# Patient Record
Sex: MALE | Race: WHITE
[De-identification: names, ages, dates, MRNs, and addresses within clinical notes are randomized per-mention and may not be internally consistent; named-entity substitution may affect disease eponyms.]

---

## 2018-03-06 ENCOUNTER — HOSPITAL ENCOUNTER (EMERGENCY)
Age: 53
LOS: 1 days | Discharge: HOME | End: 2018-03-07

## 2018-03-06 ENCOUNTER — HOSPITAL ENCOUNTER (EMERGENCY)
Dept: HOSPITAL 91 - E/R | Age: 53
LOS: 1 days | Discharge: HOME | End: 2018-03-07
Payer: COMMERCIAL

## 2018-03-06 DIAGNOSIS — R07.89: Primary | ICD-10-CM

## 2018-03-06 LAB
ADD MAN DIFF?: NO
ALANINE AMINOTRANSFERASE: 44 IU/L (ref 13–69)
ALBUMIN/GLOBULIN RATIO: 1.51
ALBUMIN: 4.4 G/DL (ref 3.3–4.9)
ALKALINE PHOSPHATASE: 71 IU/L (ref 42–121)
ANION GAP: 16 (ref 8–16)
ASPARTATE AMINO TRANSFERASE: 31 IU/L (ref 15–46)
B-TYPE NATRIURETIC PEPTIDE: 49 PG/ML (ref 0–125)
BASOPHIL #: 0.1 10^3/UL (ref 0–0.1)
BASOPHILS %: 0.7 % (ref 0–2)
BILIRUBIN,DIRECT: 0 MG/DL (ref 0–0.2)
BILIRUBIN,TOTAL: 0.3 MG/DL (ref 0.2–1.3)
BLOOD UREA NITROGEN: 21 MG/DL (ref 7–20)
CALCIUM: 9.9 MG/DL (ref 8.4–10.2)
CARBON DIOXIDE: 27 MMOL/L (ref 21–31)
CHLORIDE: 102 MMOL/L (ref 97–110)
CREATININE: 0.82 MG/DL (ref 0.61–1.24)
EOSINOPHILS #: 0.2 10^3/UL (ref 0–0.5)
EOSINOPHILS %: 2.3 % (ref 0–7)
GLOBULIN: 2.9 G/DL (ref 1.3–3.2)
GLUCOSE: 116 MG/DL (ref 70–220)
HEMATOCRIT: 45.2 % (ref 42–52)
HEMOGLOBIN: 15.6 G/DL (ref 14–18)
LYMPHOCYTES #: 3.3 10^3/UL (ref 0.8–2.9)
LYMPHOCYTES %: 37.8 % (ref 15–51)
MEAN CORPUSCULAR HEMOGLOBIN: 30.5 PG (ref 29–33)
MEAN CORPUSCULAR HGB CONC: 34.5 G/DL (ref 32–37)
MEAN CORPUSCULAR VOLUME: 88.3 FL (ref 82–101)
MEAN PLATELET VOLUME: 11 FL (ref 7.4–10.4)
MONOCYTE #: 0.7 10^3/UL (ref 0.3–0.9)
MONOCYTES %: 7.8 % (ref 0–11)
NEUTROPHIL #: 4.5 10^3/UL (ref 1.6–7.5)
NEUTROPHILS %: 51.1 % (ref 39–77)
NUCLEATED RED BLOOD CELLS #: 0 10^3/UL (ref 0–0)
NUCLEATED RED BLOOD CELLS%: 0 /100WBC (ref 0–0)
PLATELET COUNT: 203 10^3/UL (ref 140–415)
POTASSIUM: 3.9 MMOL/L (ref 3.5–5.1)
RED BLOOD COUNT: 5.12 10^6/UL (ref 4.7–6.1)
RED CELL DISTRIBUTION WIDTH: 13.4 % (ref 11.5–14.5)
SODIUM: 141 MMOL/L (ref 135–144)
TOTAL PROTEIN: 7.3 G/DL (ref 6.1–8.1)
TROPONIN-I: < 0.012 NG/ML (ref 0–0.12)
WHITE BLOOD COUNT: 8.7 10^3/UL (ref 4.8–10.8)

## 2018-03-06 PROCEDURE — 84484 ASSAY OF TROPONIN QUANT: CPT

## 2018-03-06 PROCEDURE — 99285 EMERGENCY DEPT VISIT HI MDM: CPT

## 2018-03-06 PROCEDURE — 36415 COLL VENOUS BLD VENIPUNCTURE: CPT

## 2018-03-06 PROCEDURE — 93005 ELECTROCARDIOGRAM TRACING: CPT

## 2018-03-06 PROCEDURE — 71045 X-RAY EXAM CHEST 1 VIEW: CPT

## 2018-03-06 PROCEDURE — 83880 ASSAY OF NATRIURETIC PEPTIDE: CPT

## 2018-03-06 PROCEDURE — 85025 COMPLETE CBC W/AUTO DIFF WBC: CPT

## 2018-03-06 PROCEDURE — 80053 COMPREHEN METABOLIC PANEL: CPT

## 2018-03-06 RX ADMIN — ONDANSETRON HYDROCHLORIDE 1 MG: 2 INJECTION, SOLUTION INTRAMUSCULAR; INTRAVENOUS at 22:35

## 2018-03-06 RX ADMIN — LIDOCAINE HYDROCHLORIDE 1 MLS/HR: 10 INJECTION, SOLUTION EPIDURAL; INFILTRATION; INTRACAUDAL; PERINEURAL at 23:00

## 2022-02-17 ENCOUNTER — OFFICE VISIT (OUTPATIENT)
Dept: URBAN - METROPOLITAN AREA CLINIC 89 | Facility: CLINIC | Age: 57
End: 2022-02-17
Payer: COMMERCIAL

## 2022-02-17 DIAGNOSIS — H11.041 PERIPHERAL PTERYGIUM, STATIONARY, RIGHT EYE: ICD-10-CM

## 2022-02-17 DIAGNOSIS — H25.13 AGE-RELATED NUCLEAR CATARACT, BILATERAL: Primary | ICD-10-CM

## 2022-02-17 DIAGNOSIS — H11.152 PINGUECULA, LEFT EYE: ICD-10-CM

## 2022-02-17 PROCEDURE — 99204 OFFICE O/P NEW MOD 45 MIN: CPT | Performed by: OPTOMETRIST

## 2022-02-17 PROCEDURE — 92134 CPTRZ OPH DX IMG PST SGM RTA: CPT | Performed by: OPTOMETRIST

## 2022-02-17 PROCEDURE — 92015 DETERMINE REFRACTIVE STATE: CPT | Performed by: OPTOMETRIST

## 2022-02-17 ASSESSMENT — KERATOMETRY
OD: 42.55
OS: 43.20

## 2022-02-17 ASSESSMENT — INTRAOCULAR PRESSURE
OD: 11
OS: 11

## 2022-02-17 NOTE — IMPRESSION/PLAN
Impression: Peripheral pterygium, stationary, right eye: H11.041. Plan: Recommend annual examination. No treatment otherwise recommended at this time.

## 2022-02-17 NOTE — IMPRESSION/PLAN
Impression: Age-related nuclear cataract, bilateral: H25.13. Plan: Patient has complaints consistent with visually significant cataracts. Will proceed with cataract surgery right eye first.  Discussed the risks, benefits, and alternatives of cataract surgery. Given that we almost never use retrobulbar anesthesia, there is typically no need to stop any anticoagulant medications when a patient gets cataract surgery with us. In most cataract surgeries performed by us we place an antibiotic and steroid at the time of surgery so most likely will not need to use any prescription post-op drops. The patient stated a full understanding and a desire to proceed with the procedure. Biometry ordered for intraocular lens selection. Advised against driving until complete. Reviewed the increased risk of retinal detachment after cataract surgery and reviewed retinal detachment and general warnings and to contact us immediately should any of those develop. CE OU, OD first then OS. MOCT, glare testing and refraction performed.

## 2022-03-04 ENCOUNTER — TESTING ONLY (OUTPATIENT)
Dept: URBAN - METROPOLITAN AREA CLINIC 88 | Facility: CLINIC | Age: 57
End: 2022-03-04
Payer: COMMERCIAL

## 2022-03-04 PROCEDURE — 92025 CPTRIZED CORNEAL TOPOGRAPHY: CPT | Performed by: OPHTHALMOLOGY
